# Patient Record
Sex: MALE | Race: BLACK OR AFRICAN AMERICAN | Employment: FULL TIME | URBAN - METROPOLITAN AREA
[De-identification: names, ages, dates, MRNs, and addresses within clinical notes are randomized per-mention and may not be internally consistent; named-entity substitution may affect disease eponyms.]

---

## 2023-08-18 ENCOUNTER — HOSPITAL ENCOUNTER (EMERGENCY)
Age: 38
Discharge: HOME | End: 2023-08-18
Attending: EMERGENCY MEDICINE
Payer: COMMERCIAL

## 2023-08-18 ENCOUNTER — APPOINTMENT (OUTPATIENT)
Dept: GENERAL RADIOLOGY | Age: 38
End: 2023-08-18
Payer: COMMERCIAL

## 2023-08-18 VITALS
OXYGEN SATURATION: 99 % | DIASTOLIC BLOOD PRESSURE: 92 MMHG | RESPIRATION RATE: 18 BRPM | SYSTOLIC BLOOD PRESSURE: 151 MMHG | HEART RATE: 68 BPM | TEMPERATURE: 98.2 F

## 2023-08-18 DIAGNOSIS — M79.602 LEFT ARM PAIN: Primary | ICD-10-CM

## 2023-08-18 LAB
CHP ED QC CHECK: YES
CHP ED QC CHECK: YES
GLUCOSE BLD-MCNC: 117 MG/DL
GLUCOSE BLD-MCNC: 117 MG/DL
GLUCOSE BLD-MCNC: 117 MG/DL (ref 70–99)
PERFORMED ON: ABNORMAL

## 2023-08-18 PROCEDURE — 96372 THER/PROPH/DIAG INJ SC/IM: CPT

## 2023-08-18 PROCEDURE — 6360000002 HC RX W HCPCS: Performed by: PHYSICIAN ASSISTANT

## 2023-08-18 PROCEDURE — 73110 X-RAY EXAM OF WRIST: CPT

## 2023-08-18 PROCEDURE — 73080 X-RAY EXAM OF ELBOW: CPT

## 2023-08-18 PROCEDURE — 99284 EMERGENCY DEPT VISIT MOD MDM: CPT

## 2023-08-18 RX ORDER — LOSARTAN POTASSIUM 50 MG/1
50 TABLET ORAL DAILY
COMMUNITY

## 2023-08-18 RX ORDER — HYDROCHLOROTHIAZIDE 25 MG/1
25 TABLET ORAL DAILY
COMMUNITY

## 2023-08-18 RX ORDER — AMLODIPINE BESYLATE 10 MG/1
10 TABLET ORAL DAILY
COMMUNITY

## 2023-08-18 RX ORDER — OMEPRAZOLE 10 MG/1
10 CAPSULE, DELAYED RELEASE ORAL DAILY
COMMUNITY

## 2023-08-18 RX ORDER — KETOROLAC TROMETHAMINE 30 MG/ML
15 INJECTION, SOLUTION INTRAMUSCULAR; INTRAVENOUS ONCE
Status: COMPLETED | OUTPATIENT
Start: 2023-08-18 | End: 2023-08-18

## 2023-08-18 RX ORDER — ATENOLOL 50 MG/1
50 TABLET ORAL DAILY
COMMUNITY

## 2023-08-18 RX ORDER — METHYLPREDNISOLONE 4 MG/1
TABLET ORAL
Qty: 1 KIT | Refills: 0 | Status: SHIPPED | OUTPATIENT
Start: 2023-08-18 | End: 2023-08-24

## 2023-08-18 RX ORDER — ATORVASTATIN CALCIUM 20 MG/1
20 TABLET, FILM COATED ORAL DAILY
COMMUNITY

## 2023-08-18 RX ADMIN — KETOROLAC TROMETHAMINE 15 MG: 30 INJECTION, SOLUTION INTRAMUSCULAR; INTRAVENOUS at 21:09

## 2023-08-18 ASSESSMENT — PAIN DESCRIPTION - LOCATION: LOCATION: ELBOW;WRIST

## 2023-08-18 ASSESSMENT — PAIN DESCRIPTION - ORIENTATION
ORIENTATION: LEFT
ORIENTATION: LEFT

## 2023-08-18 ASSESSMENT — PAIN - FUNCTIONAL ASSESSMENT: PAIN_FUNCTIONAL_ASSESSMENT: 0-10

## 2023-08-18 ASSESSMENT — PAIN SCALES - GENERAL
PAINLEVEL_OUTOF10: 4
PAINLEVEL_OUTOF10: 4

## 2023-08-18 ASSESSMENT — ENCOUNTER SYMPTOMS
BACK PAIN: 0
GASTROINTESTINAL NEGATIVE: 1
RESPIRATORY NEGATIVE: 1

## 2023-08-18 ASSESSMENT — PAIN DESCRIPTION - DESCRIPTORS
DESCRIPTORS: DULL;NAGGING
DESCRIPTORS: DULL

## 2023-08-19 NOTE — DISCHARGE INSTRUCTIONS
-ice, elevate for swelling  -medrol dose pack-this is a steroid. You should closely monitor your glucose levels. This medication will most likely elevate them while you are taking it. You can help by lowering your carb intake during the period of time you are on the steroid  -take Tylenol while taking the steroid, don't take the Ibuprofen  -follow up with PCP  -follow up with orthopedics.  Call to schedule an appointment  -Plan for worsening of symptoms such as fevers of 100.5 or greater not relieved by Tylenol, worsening swelling, numbness of your extremities or other concerns

## 2023-08-19 NOTE — ED PROVIDER NOTES
borderline usually running around 1.3, 1.4. For this reason I will have him stop the ibuprofen, take Tylenol and will give him a Medrol Dosepak. He has a history of diabetes and was told he should closely monitor his sugars while on the steroid as it can elevate it temporarily. It would help if he is watching his carb intake during that. He is on steroids. He is encouraged to ice and elevate for swelling. He is to follow-up with his primary care physician and orthopedics for reevaluation however is to return for worsening symptoms or concerns as discussed. Amount and/or Complexity of Data Reviewed  Labs: ordered. Radiology: ordered. Risk  Prescription drug management. This patient was also evaluated by the attending physician. All care plans were discussed and agreed upon. Clinical Impression     1. Left arm pain        Disposition     PATIENT REFERRED TO:  your primary care physician    Call   As needed    Galindo Bennett MD  [address] [place] [address]  [phone]    Call   for follow up of persistent pain      DISCHARGE MEDICATIONS:  Current Discharge Medication List          DISPOSITION Discharge - Pending Orders Complete 08/18/2023 08:57:45 PM        Diagnostic Results and Other Data     RADIOLOGY:  XR WRIST LEFT (MIN 3 VIEWS)   Final Result      1. No acute fracture or erosion. 2.  Mild diffuse swelling. 3.  Scapholunate dissociation suggesting ligamentous injury whether recent or   chronic. 4.  Lunotriquetral coalition. XR ELBOW LEFT (MIN 3 VIEWS)   Final Result      Diffuse swelling without acute osseous findings. LABS:   Results for orders placed or performed during the hospital encounter of 08/18/23   AccuCheck   Result Value Ref Range    Glucose 117 mg/dL    QC OK? yes    POCT Glucose   Result Value Ref Range    Glucose 117 mg/dL    QC OK?  yes    POCT Glucose   Result Value Ref Range    POC Glucose 117 (H) 70 - 99 mg/dl    Performed on extremities. Intact sensation throughout. He is ambulating without difficulty   Psychiatric:         Mood and Affect: Mood normal.         Behavior: Behavior normal.         Thought Content:  Thought content normal.         Judgment: Judgment normal.        CORINNA Quintana  08/18/23 [id number]

## 2023-08-19 NOTE — ED PROVIDER NOTES
ED Attending Attestation Note     Date of evaluation: 8/18/2023    This patient was seen by the advance practice provider. I have seen and examined the patient, agree with the workup, evaluation, management and diagnosis. The care plan has been discussed. My assessment reveals presents with left elbow and wrist pain. He was on antibiotics from outside urgent care. States he has persistent pain. Denies any trauma. Does have a history of gout but only has had this in his feet.   He is awake alert the elbow has mild swelling as well as the wrist he is able to range it in pronation and supination pulses are intact there is no overt erythema or warmth     Bridget Ashby MD  08/18/23 2009